# Patient Record
Sex: MALE | Race: BLACK OR AFRICAN AMERICAN | Employment: FULL TIME | URBAN - METROPOLITAN AREA
[De-identification: names, ages, dates, MRNs, and addresses within clinical notes are randomized per-mention and may not be internally consistent; named-entity substitution may affect disease eponyms.]

---

## 2017-04-10 ENCOUNTER — HOSPITAL ENCOUNTER (EMERGENCY)
Age: 22
Discharge: HOME | End: 2017-04-10
Attending: EMERGENCY MEDICINE

## 2017-04-10 VITALS
WEIGHT: 160 LBS | HEIGHT: 74 IN | SYSTOLIC BLOOD PRESSURE: 122 MMHG | OXYGEN SATURATION: 100 % | DIASTOLIC BLOOD PRESSURE: 89 MMHG | HEART RATE: 89 BPM | BODY MASS INDEX: 20.53 KG/M2 | RESPIRATION RATE: 18 BRPM | TEMPERATURE: 99 F

## 2017-04-10 DIAGNOSIS — N48.6 PEYRONIE DISEASE: Primary | ICD-10-CM

## 2017-04-10 PROCEDURE — 87591 N.GONORRHOEAE DNA AMP PROB: CPT | Performed by: EMERGENCY MEDICINE

## 2017-04-10 PROCEDURE — 99284 EMERGENCY DEPT VISIT MOD MDM: CPT

## 2017-04-10 PROCEDURE — 99283 EMERGENCY DEPT VISIT LOW MDM: CPT

## 2017-04-10 PROCEDURE — 81001 URINALYSIS AUTO W/SCOPE: CPT | Performed by: EMERGENCY MEDICINE

## 2017-04-10 PROCEDURE — 87491 CHLMYD TRACH DNA AMP PROBE: CPT | Performed by: EMERGENCY MEDICINE

## 2017-04-10 NOTE — ED PROVIDER NOTES
Patient Seen in: THE [facility] Emergency Department In [place]    History   Patient presents with:  Eval-G (gynecologic)    Stated Complaint: Penile pain x 2 days.      HPI    The patient is a 68-year-old previously healthy male presenting to the Emergency [facility] neck  Cardiovascular: Regular rhythm without murmurs rubs or gallops, no peripheral edema or JVD  Lungs: Speaking full sentences without any distress or retractions. Clear to auscultation bilaterally no wheezes or rales  Abdomen: Normoactive bowel sounds. Varinder Linares MD  [id number]  [id number]  [phone]    Call in 1 day        Medications Prescribed:  There are no discharge medications for this patient.

## 2017-04-10 NOTE — ED INITIAL ASSESSMENT (HPI)
Pt c/o penile pain x 2 days. Sts worse with erection. Denies any discharge of rashes. Denies pain in scrotum.

## 2018-07-21 ENCOUNTER — HOSPITAL ENCOUNTER (EMERGENCY)
Facility: HOSPITAL | Age: 23
Discharge: HOME | End: 2018-07-21
Attending: PEDIATRICS

## 2018-07-21 VITALS
RESPIRATION RATE: 16 BRPM | DIASTOLIC BLOOD PRESSURE: 44 MMHG | BODY MASS INDEX: 19.25 KG/M2 | HEART RATE: 46 BPM | HEIGHT: 74 IN | WEIGHT: 150 LBS | OXYGEN SATURATION: 100 % | SYSTOLIC BLOOD PRESSURE: 113 MMHG | TEMPERATURE: 98 F

## 2018-07-21 DIAGNOSIS — R36.9 PENILE DISCHARGE: Primary | ICD-10-CM

## 2018-07-21 LAB
BILIRUB UR QL STRIP.AUTO: NEGATIVE
COLOR UR AUTO: YELLOW
GLUCOSE UR STRIP.AUTO-MCNC: NEGATIVE MG/DL
KETONES UR STRIP.AUTO-MCNC: NEGATIVE MG/DL
NITRITE UR QL STRIP.AUTO: NEGATIVE
PH UR STRIP.AUTO: 8 [PH] (ref 4.5–8)
PROT UR STRIP.AUTO-MCNC: 30 MG/DL
RBC UR QL AUTO: NEGATIVE
SP GR UR STRIP.AUTO: 1.02 (ref 1–1.03)
UROBILINOGEN UR STRIP.AUTO-MCNC: 2 MG/DL
WBC #/AREA URNS AUTO: >50 /HPF

## 2018-07-21 PROCEDURE — 87591 N.GONORRHOEAE DNA AMP PROB: CPT | Performed by: PEDIATRICS

## 2018-07-21 PROCEDURE — 87491 CHLMYD TRACH DNA AMP PROBE: CPT | Performed by: PEDIATRICS

## 2018-07-21 PROCEDURE — 99283 EMERGENCY DEPT VISIT LOW MDM: CPT

## 2018-07-21 PROCEDURE — 87086 URINE CULTURE/COLONY COUNT: CPT | Performed by: PEDIATRICS

## 2018-07-21 PROCEDURE — 96372 THER/PROPH/DIAG INJ SC/IM: CPT

## 2018-07-21 PROCEDURE — 81001 URINALYSIS AUTO W/SCOPE: CPT | Performed by: PEDIATRICS

## 2018-07-21 RX ORDER — LIDOCAINE HYDROCHLORIDE 10 MG/ML
INJECTION, SOLUTION EPIDURAL; INFILTRATION; INTRACAUDAL; PERINEURAL
Status: COMPLETED
Start: 2018-07-21 | End: 2018-07-21

## 2018-07-21 RX ORDER — CEFTRIAXONE SODIUM 250 MG/1
250 INJECTION, POWDER, FOR SOLUTION INTRAMUSCULAR; INTRAVENOUS ONCE
Status: COMPLETED | OUTPATIENT
Start: 2018-07-21 | End: 2018-07-21

## 2018-07-21 RX ORDER — AZITHROMYCIN 250 MG/1
1000 TABLET, FILM COATED ORAL ONCE
Status: COMPLETED | OUTPATIENT
Start: 2018-07-21 | End: 2018-07-21

## 2018-07-21 NOTE — ED INITIAL ASSESSMENT (HPI)
2 days ago noticed discharge (yellow/greenish) from penis; since has had burning with urination; denies fever

## 2018-07-21 NOTE — ED PROVIDER NOTES
Patient Seen in: [facility] Emergency Department    History   Patient presents with:  Eval-G (gynecologic)    Stated Complaint: pain with urination and penile discharge    HPI    53-year-old male here with penile discharge of the last 2 days.   He is is not diaphoretic. No pallor. Psychiatric: He has a normal mood and affect.  His behavior is normal.       ED Course     Labs Reviewed   URINALYSIS WITH CULTURE REFLEX - Abnormal; Notable for the following:        Result Value    Clarity Urine Cloudy (*) patient.

## 2018-07-22 LAB
C TRACH DNA SPEC QL NAA+PROBE: POSITIVE
N GONORRHOEA DNA SPEC QL NAA+PROBE: POSITIVE

## 2018-09-16 ENCOUNTER — HOSPITAL ENCOUNTER (EMERGENCY)
Facility: HOSPITAL | Age: 23
Discharge: HOME | End: 2018-09-16
Attending: EMERGENCY MEDICINE

## 2018-09-16 VITALS
HEART RATE: 60 BPM | BODY MASS INDEX: 20.53 KG/M2 | RESPIRATION RATE: 18 BRPM | HEIGHT: 74 IN | OXYGEN SATURATION: 99 % | WEIGHT: 160 LBS | TEMPERATURE: 97 F | DIASTOLIC BLOOD PRESSURE: 99 MMHG | SYSTOLIC BLOOD PRESSURE: 131 MMHG

## 2018-09-16 DIAGNOSIS — A64 STD (MALE): Primary | ICD-10-CM

## 2018-09-16 PROCEDURE — 87591 N.GONORRHOEAE DNA AMP PROB: CPT | Performed by: EMERGENCY MEDICINE

## 2018-09-16 PROCEDURE — 99284 EMERGENCY DEPT VISIT MOD MDM: CPT

## 2018-09-16 PROCEDURE — 99283 EMERGENCY DEPT VISIT LOW MDM: CPT

## 2018-09-16 PROCEDURE — 96372 THER/PROPH/DIAG INJ SC/IM: CPT

## 2018-09-16 PROCEDURE — 87491 CHLMYD TRACH DNA AMP PROBE: CPT | Performed by: EMERGENCY MEDICINE

## 2018-09-16 RX ORDER — AZITHROMYCIN 250 MG/1
1000 TABLET, FILM COATED ORAL ONCE
Status: COMPLETED | OUTPATIENT
Start: 2018-09-16 | End: 2018-09-16

## 2018-09-16 RX ORDER — METRONIDAZOLE 500 MG/1
2000 TABLET ORAL ONCE
Status: COMPLETED | OUTPATIENT
Start: 2018-09-16 | End: 2018-09-16

## 2018-09-16 RX ORDER — ONDANSETRON 4 MG/1
4 TABLET, ORALLY DISINTEGRATING ORAL ONCE
Status: COMPLETED | OUTPATIENT
Start: 2018-09-16 | End: 2018-09-16

## 2018-09-16 NOTE — ED PROVIDER NOTES
Patient Seen in: [facility] Emergency Department    History   Patient presents with:  Eval-G (gynecologic)    Stated Complaint:     HPI    Presents to the emergency department with green urethral discharge he states that he had this as well a month Clinical Impression:  STD (male)  (primary encounter diagnosis)    Disposition:  Discharge  9/16/2018  4:12 am    Follow-up:        your doctor        Medications Prescribed:  There are no discharge medications for this patient.

## 2018-09-17 LAB
C TRACH DNA SPEC QL NAA+PROBE: NEGATIVE
N GONORRHOEA DNA SPEC QL NAA+PROBE: POSITIVE

## 2019-03-29 ENCOUNTER — HOSPITAL ENCOUNTER (EMERGENCY)
Facility: HOSPITAL | Age: 24
Discharge: HOME | End: 2019-03-29
Attending: EMERGENCY MEDICINE

## 2019-03-29 VITALS
SYSTOLIC BLOOD PRESSURE: 120 MMHG | HEIGHT: 74 IN | DIASTOLIC BLOOD PRESSURE: 67 MMHG | OXYGEN SATURATION: 99 % | TEMPERATURE: 98 F | WEIGHT: 160 LBS | RESPIRATION RATE: 17 BRPM | HEART RATE: 50 BPM | BODY MASS INDEX: 20.53 KG/M2

## 2019-03-29 DIAGNOSIS — T49.95XA SKIN IRRITATION DUE TO TOPICAL AGENT: Primary | ICD-10-CM

## 2019-03-29 DIAGNOSIS — L24.A0 IRRITANT CONTACT DERMATITIS DUE TO FRICTION: ICD-10-CM

## 2019-03-29 DIAGNOSIS — R23.8 SKIN IRRITATION DUE TO TOPICAL AGENT: Primary | ICD-10-CM

## 2019-03-29 PROCEDURE — 99282 EMERGENCY DEPT VISIT SF MDM: CPT | Performed by: EMERGENCY MEDICINE

## 2019-03-30 NOTE — ED PROVIDER NOTES
Patient Seen in: [facility] Emergency Department    History   Patient presents with:  Eval-G (genital)    Stated Complaint: rash on penis    HPI    22-year-old presents to the emergency department with his significant other.   He states that up until likely from friction on his penis as he is no longer wearing the protective coating of a condom and likely exacerbated this by placing which monalisa which is a very drying compound on the penile shaft.   I discussed this with him and with his girlfriend and h

## 2023-02-14 ENCOUNTER — IMAGING SERVICES (OUTPATIENT)
Dept: GENERAL RADIOLOGY | Age: 28
End: 2023-02-14
Attending: FAMILY MEDICINE

## 2023-02-14 ENCOUNTER — OFFICE VISIT (OUTPATIENT)
Dept: FAMILY MEDICINE | Age: 28
End: 2023-02-14

## 2023-02-14 VITALS
DIASTOLIC BLOOD PRESSURE: 70 MMHG | WEIGHT: 153.99 LBS | OXYGEN SATURATION: 99 % | RESPIRATION RATE: 18 BRPM | BODY MASS INDEX: 19.76 KG/M2 | HEIGHT: 74 IN | SYSTOLIC BLOOD PRESSURE: 110 MMHG | TEMPERATURE: 97.8 F | HEART RATE: 60 BPM

## 2023-02-14 DIAGNOSIS — S32.463D: ICD-10-CM

## 2023-02-14 DIAGNOSIS — Z87.81 S/P RIGHT HIP FRACTURE: ICD-10-CM

## 2023-02-14 DIAGNOSIS — S32.463D: Primary | ICD-10-CM

## 2023-02-14 DIAGNOSIS — Z00.00 ANNUAL PHYSICAL EXAM: ICD-10-CM

## 2023-02-14 PROCEDURE — 73502 X-RAY EXAM HIP UNI 2-3 VIEWS: CPT | Performed by: RADIOLOGY

## 2023-02-14 PROCEDURE — 99205 OFFICE O/P NEW HI 60 MIN: CPT | Performed by: FAMILY MEDICINE

## 2023-02-14 ASSESSMENT — PATIENT HEALTH QUESTIONNAIRE - PHQ9
1. LITTLE INTEREST OR PLEASURE IN DOING THINGS: NOT AT ALL
SUM OF ALL RESPONSES TO PHQ9 QUESTIONS 1 AND 2: 0
SUM OF ALL RESPONSES TO PHQ9 QUESTIONS 1 AND 2: 0
CLINICAL INTERPRETATION OF PHQ2 SCORE: NO FURTHER SCREENING NEEDED
2. FEELING DOWN, DEPRESSED OR HOPELESS: NOT AT ALL

## 2023-02-14 ASSESSMENT — ENCOUNTER SYMPTOMS
GASTROINTESTINAL NEGATIVE: 1
ENDOCRINE NEGATIVE: 1
RESPIRATORY NEGATIVE: 1
HEMATOLOGIC/LYMPHATIC NEGATIVE: 1
NEUROLOGICAL NEGATIVE: 1

## 2023-02-14 ASSESSMENT — PAIN SCALES - GENERAL: PAINLEVEL: 0

## (undated) NOTE — ED AVS SNAPSHOT
Anita Bob   MRN: [id number]    Department:  [facility] Emergency Department   Date of Visit:  7/21/2018           Disclosure     Insurance plans vary and the physician(s) referred by the ER may not be covered by your plan.  Please contact yo tell this physician (or your personal doctor if your instructions are to return to your personal doctor) about any new or lasting problems. The primary care or specialist physician will see patients referred from the [facility] Emergency Department.  Severo Pastures

## (undated) NOTE — ED AVS SNAPSHOT
THE [facility] Emergency Department in [address] [facility] [place]    Phone:  [phone]    Fax:  [phone]   MRN: [id number]    Department:  THE [facility] Emergency Department in [place]   Date of Visit IF THERE IS ANY CHANGE OR WORSENING OF YOUR CONDITION, CALL YOUR PRIMARY CARE PHYSICIAN AT ONCE OR RETURN IMMEDIATELY TO THE EMERGENCY DEPARTMENT.     If you have been prescribed any medication(s), please fill your prescription right away and begin taking t

## (undated) NOTE — ED AVS SNAPSHOT
Stephanie Goff   MRN: [id number]    Department:  [facility] Emergency Department   Date of Visit:  9/16/2018           Disclosure     Insurance plans vary and the physician(s) referred by the ER may not be covered by your plan.  Please contact yo tell this physician (or your personal doctor if your instructions are to return to your personal doctor) about any new or lasting problems. The primary care or specialist physician will see patients referred from the [facility] Emergency Department.  Mane Morillo

## (undated) NOTE — ED AVS SNAPSHOT
THE [facility] Emergency Department in [address]    Phone:  [phone]    Fax:  [phone]   MRN: [id number]    Department:  THE [facility] Emergency Department in [place]   Date of Visit Si usted tiene algun problema con guzman sequimiento, por favor llame a nuestro adminstrador de aileenos al [phone]    Expect to receive an electronic request (by e-mail or text) to complete a self-assessment the day after your visit.   You may also receiv Elvin Archuleta [address] ([address] [facility]) Bridget [address] ([address] [facility]) [address]  ([address] [facility]) [address] [facility] your Zip Code and Date of Birth to complete the sign-up process. If you do not sign up before the expiration date, you must request a new code.     Your unique nGAP Access Code: [email or web address]  Expires: 6/9/2017  8:27 PM    If you have questions, you can ca

## (undated) NOTE — ED AVS SNAPSHOT
Brandi Stout   MRN: [id number]    Department:  [facility] Emergency Department   Date of Visit:  3/29/2019           Disclosure     Insurance plans vary and the physician(s) referred by the ER may not be covered by your plan.  Please contact yo tell this physician (or your personal doctor if your instructions are to return to your personal doctor) about any new or lasting problems. The primary care or specialist physician will see patients referred from the [facility] Emergency Department.  Chandler Thomas